# Patient Record
Sex: MALE | Race: WHITE
[De-identification: names, ages, dates, MRNs, and addresses within clinical notes are randomized per-mention and may not be internally consistent; named-entity substitution may affect disease eponyms.]

---

## 2019-05-06 ENCOUNTER — HOSPITAL ENCOUNTER (OUTPATIENT)
Dept: HOSPITAL 72 - RAD | Age: 54
Discharge: HOME | End: 2019-05-06
Payer: COMMERCIAL

## 2019-05-06 DIAGNOSIS — M19.90: Primary | ICD-10-CM

## 2019-05-06 DIAGNOSIS — M25.541: ICD-10-CM

## 2019-11-07 ENCOUNTER — OFFICE (OUTPATIENT)
Dept: URBAN - METROPOLITAN AREA CLINIC 67 | Facility: CLINIC | Age: 54
End: 2019-11-07

## 2019-11-07 VITALS — HEIGHT: 69 IN | WEIGHT: 167 LBS | DIASTOLIC BLOOD PRESSURE: 70 MMHG | SYSTOLIC BLOOD PRESSURE: 131 MMHG

## 2019-11-07 DIAGNOSIS — K21.9 GERD: ICD-10-CM

## 2019-11-07 DIAGNOSIS — K22.70 BARRETT'S ESOPHAGUS: ICD-10-CM

## 2019-11-07 PROCEDURE — 99203 OFFICE O/P NEW LOW 30 MIN: CPT | Performed by: INTERNAL MEDICINE

## 2019-11-07 NOTE — SERVICEHPINOTES
I had the pleasure of seeing this patient today.As you know, he is a 54-year-old man with a history of possible Pedraza's esophagus who is last endoscopy was several years ago and is due for a follow-up examination. He comes to see me today to establish a relationship with a new gastroenterologist to have this done. He reports that he had a colonoscopy at the age of 48 and again 2 years ago that were normal, although it is unclear to me why it was repeated in a 5 year span rather than 10 years.His symptoms of reflux are under good control when he takes lansoprazole, but he has switched to Pepcid, and with this his symptoms although somewhat controlled are not fully controlled.He denies any nausea, vomiting, hematemesis, melena, hematochezia, or unintentional weight loss. He denies any dysphagia.

## 2019-12-17 ENCOUNTER — AMBULATORY SURGICAL CENTER (OUTPATIENT)
Dept: URBAN - METROPOLITAN AREA SURGERY 42 | Facility: SURGERY | Age: 54
End: 2019-12-17

## 2019-12-17 VITALS
SYSTOLIC BLOOD PRESSURE: 114 MMHG | DIASTOLIC BLOOD PRESSURE: 72 MMHG | HEIGHT: 69 IN | OXYGEN SATURATION: 97 % | TEMPERATURE: 98.1 F | HEART RATE: 71 BPM | RESPIRATION RATE: 14 BRPM | WEIGHT: 163 LBS

## 2019-12-17 DIAGNOSIS — K20.9 ESOPHAGITIS, UNSPECIFIED: ICD-10-CM

## 2019-12-17 DIAGNOSIS — K22.70 BARRETT'S ESOPHAGUS WITHOUT DYSPLASIA: ICD-10-CM

## 2019-12-17 DIAGNOSIS — K31.7 POLYP OF STOMACH AND DUODENUM: ICD-10-CM

## 2019-12-17 DIAGNOSIS — K29.70 GASTRITIS, UNSPECIFIED, WITHOUT BLEEDING: ICD-10-CM

## 2019-12-17 PROBLEM — K44.9 DIAPHRAGMATIC HERNIA WITHOUT OBSTRUCTION OR GANGRENE: Status: ACTIVE | Noted: 2019-12-17

## 2019-12-17 LAB — SURGICAL: PDF REPORT: (no result)

## 2019-12-17 PROCEDURE — 43239 EGD BIOPSY SINGLE/MULTIPLE: CPT | Performed by: INTERNAL MEDICINE

## 2022-07-14 ENCOUNTER — OFFICE (OUTPATIENT)
Dept: URBAN - METROPOLITAN AREA CLINIC 67 | Facility: CLINIC | Age: 57
End: 2022-07-14

## 2022-07-14 VITALS — DIASTOLIC BLOOD PRESSURE: 92 MMHG | HEIGHT: 69 IN | SYSTOLIC BLOOD PRESSURE: 144 MMHG | WEIGHT: 167 LBS

## 2022-07-14 DIAGNOSIS — K22.70 BARRETT'S ESOPHAGUS: ICD-10-CM

## 2022-07-14 DIAGNOSIS — Z12.11 SCREENING FOR COLON CANCER: ICD-10-CM

## 2022-07-14 PROCEDURE — 99203 OFFICE O/P NEW LOW 30 MIN: CPT | Performed by: INTERNAL MEDICINE

## 2022-07-14 NOTE — SERVICEHPINOTES
I the pleasure of seeing this patient today.As you know, he is a 56-year-old man with a history of Pedraza's esophagus as well as a possible history of colon polyps who comes to see me for a follow-up colonoscopy and follow-up endoscopy. He is asymptomatic.He denies any nausea, vomiting, hematemesis, melena, hematochezia, or unintentional weight loss.

## 2022-09-14 ENCOUNTER — AMBULATORY SURGICAL CENTER (OUTPATIENT)
Dept: URBAN - METROPOLITAN AREA SURGERY 42 | Facility: SURGERY | Age: 57
End: 2022-09-14

## 2022-09-14 VITALS — HEIGHT: 69 IN

## 2022-09-14 DIAGNOSIS — K22.89 OTHER SPECIFIED DISEASE OF ESOPHAGUS: ICD-10-CM

## 2022-09-14 DIAGNOSIS — K44.9 DIAPHRAGMATIC HERNIA WITHOUT OBSTRUCTION OR GANGRENE: ICD-10-CM

## 2022-09-14 PROBLEM — Z86.010 SURVEILLANCE DUE TO PRIOR COLONIC NEOPLASIA: Status: ACTIVE | Noted: 2022-09-14

## 2022-09-14 PROBLEM — Z86.010 PERSONAL HISTORY COLON POLYPS: Status: ACTIVE | Noted: 2022-09-14

## 2022-09-14 LAB — SURGICAL: PDF REPORT: (no result)

## 2022-09-14 PROCEDURE — 43239 EGD BIOPSY SINGLE/MULTIPLE: CPT | Performed by: INTERNAL MEDICINE

## 2022-09-14 PROCEDURE — 45378 DIAGNOSTIC COLONOSCOPY: CPT | Performed by: INTERNAL MEDICINE
